# Patient Record
Sex: FEMALE | Race: BLACK OR AFRICAN AMERICAN | NOT HISPANIC OR LATINO | Employment: OTHER | URBAN - NONMETROPOLITAN AREA
[De-identification: names, ages, dates, MRNs, and addresses within clinical notes are randomized per-mention and may not be internally consistent; named-entity substitution may affect disease eponyms.]

---

## 2020-06-12 ENCOUNTER — HISTORICAL (OUTPATIENT)
Dept: ADMINISTRATIVE | Facility: HOSPITAL | Age: 80
End: 2020-06-12

## 2020-06-12 LAB
ALBUMIN SERPL BCP-MCNC: 2.7 G/DL (ref 3.5–5)
ALBUMIN/GLOB SERPL ELPH: 0.5 {RATIO} (ref 1.5–2.2)
ALP SERPL-CCNC: 533 U/L (ref 45–117)
ALT SERPL W P-5'-P-CCNC: 23 U/L (ref 13–56)
ANION GAP SERPL CALC-SCNC: 5.7 MEQ/L (ref 10–20)
AST SERPL-CCNC: 30 U/L (ref 15–37)
BASOPHILS NFR BLD: 0.1 10 (ref 0–0.1)
BASOPHILS NFR BLD: 0.8 % (ref 0–1.5)
BILIRUB SERPL-MCNC: 0.7 MG/DL (ref 0.2–1)
BUN SERPL-MCNC: 34 MG/DL (ref 7–18)
CALCIUM SERPL-MCNC: 8.7 MG/DL (ref 8.5–10.1)
CHLORIDE SERPL-SCNC: 98 MMOL/L (ref 98–107)
CO2 SERPL-SCNC: 31 MMOL/L (ref 22–32)
CREAT SERPL-MCNC: 1.36 MG/DL (ref 0.55–1.02)
EGFR: 48 ML/MIN/1.73M
EOSINOPHIL NFR BLD: 0 % (ref 0–7)
EOSINOPHIL NFR BLD: 0 10 (ref 0–0.7)
ERYTHROCYTE [DISTWIDTH] IN BLOOD BY AUTOMATED COUNT: 17.2 % (ref 11.5–14.5)
GLOBULIN: 5.2 G/DL (ref 2.3–3.5)
GLUCOSE SERPL-MCNC: 94 MG/DL (ref 70–99)
GRAN #: 5.76 10 (ref 2–7.5)
GRAN%: 0.3 %
GRAN%: 75.4 % (ref 50–80)
HCT VFR BLD AUTO: 37.5 % (ref 37.7–47.9)
HGB BLD-MCNC: 11.4 G/DL (ref 12.2–16.2)
IMMATURE GRANULOCYTES #: 0.02 10
LYMPH #: 1.1 10 (ref 1–3.5)
LYMPH%: 14.1 % (ref 12–50)
MCH RBC QN AUTO: 24.6 PG (ref 27–31)
MCHC RBC AUTO-ENTMCNC: 30.4 G% (ref 32–35)
MCV RBC AUTO: 80.8 FL (ref 80–97)
MONO #: 0.7 10 (ref 0–0.8)
MONO%: 9.4 % (ref 0–12)
OSMOC: 268 MOSM/KG (ref 275–295)
PMV BLD AUTO: 440 10 (ref 142–424)
PMV BLD AUTO: 9.4 FL (ref 7.4–10.4)
POTASSIUM SERPL-SCNC: 4.7 MMOL/L (ref 3.5–5.1)
PROT SERPL-MCNC: 7.9 G/DL (ref 6.4–8.2)
RBC # BLD AUTO: 4.64 M/UL (ref 4.04–5.48)
SODIUM BLD-SCNC: 130 MMOL/L (ref 136–145)
WBC # BLD AUTO: 7.6 10 (ref 4–10.2)

## 2020-06-25 ENCOUNTER — HISTORICAL (OUTPATIENT)
Dept: ADMINISTRATIVE | Facility: HOSPITAL | Age: 80
End: 2020-06-25

## 2020-07-30 DIAGNOSIS — I50.82 BIVENTRICULAR FAILURE: Primary | ICD-10-CM

## 2020-07-30 DIAGNOSIS — R06.02 SHORTNESS OF BREATH: ICD-10-CM

## 2020-07-30 DIAGNOSIS — R07.9 CHEST PAIN, UNSPECIFIED: ICD-10-CM

## 2020-07-31 DIAGNOSIS — I50.82 BIVENTRICULAR FAILURE: ICD-10-CM

## 2020-07-31 DIAGNOSIS — R06.02 SHORTNESS OF BREATH: Primary | ICD-10-CM

## 2020-08-03 ENCOUNTER — HOSPITAL ENCOUNTER (OUTPATIENT)
Dept: RADIOLOGY | Facility: HOSPITAL | Age: 80
Discharge: HOME | End: 2020-08-03
Attending: INTERNAL MEDICINE
Payer: MEDICARE

## 2020-08-03 DIAGNOSIS — R06.02 SHORTNESS OF BREATH: ICD-10-CM

## 2020-08-03 DIAGNOSIS — I50.82 BIVENTRICULAR FAILURE: ICD-10-CM

## 2020-08-03 PROCEDURE — 25500020 PHARM REV CODE 255: Performed by: INTERNAL MEDICINE

## 2020-08-03 PROCEDURE — 74175 CTA ABDOMEN W/CONTRAST: CPT | Mod: TC

## 2020-08-03 RX ADMIN — IOHEXOL 100 ML: 350 INJECTION, SOLUTION INTRAVENOUS at 09:08

## 2020-11-10 DIAGNOSIS — Z12.31 ENCOUNTER FOR SCREENING MAMMOGRAM FOR MALIGNANT NEOPLASM OF BREAST: Primary | ICD-10-CM

## 2020-11-23 ENCOUNTER — HOSPITAL ENCOUNTER (OUTPATIENT)
Dept: RADIOLOGY | Facility: HOSPITAL | Age: 80
Discharge: HOME | End: 2020-11-23
Attending: INTERNAL MEDICINE
Payer: MEDICARE

## 2020-11-23 VITALS — BODY MASS INDEX: 20.02 KG/M2 | HEIGHT: 63 IN | WEIGHT: 113 LBS

## 2020-11-23 DIAGNOSIS — Z12.31 ENCOUNTER FOR SCREENING MAMMOGRAM FOR MALIGNANT NEOPLASM OF BREAST: ICD-10-CM

## 2020-11-23 PROCEDURE — 77067 SCR MAMMO BI INCL CAD: CPT | Mod: TC

## 2021-04-01 PROCEDURE — 91300 COVID-19, MRNA, LNP-S, PF, 30 MCG/0.3 ML DOSE VACCINE: CPT | Mod: ,,, | Performed by: ANESTHESIOLOGY

## 2021-04-01 PROCEDURE — 91300 COVID-19, MRNA, LNP-S, PF, 30 MCG/0.3 ML DOSE VACCINE: ICD-10-PCS | Mod: ,,, | Performed by: ANESTHESIOLOGY

## 2021-04-01 PROCEDURE — 0001A COVID-19, MRNA, LNP-S, PF, 30 MCG/0.3 ML DOSE VACCINE: CPT | Mod: CV19,,, | Performed by: ANESTHESIOLOGY

## 2021-04-01 PROCEDURE — 0001A COVID-19, MRNA, LNP-S, PF, 30 MCG/0.3 ML DOSE VACCINE: ICD-10-PCS | Mod: CV19,,, | Performed by: ANESTHESIOLOGY

## 2021-04-23 ENCOUNTER — IMMUNIZATION (OUTPATIENT)
Dept: OBSTETRICS AND GYNECOLOGY | Facility: CLINIC | Age: 81
End: 2021-04-23
Payer: MEDICARE

## 2021-04-23 DIAGNOSIS — Z23 NEED FOR VACCINATION: Primary | ICD-10-CM

## 2021-04-23 PROCEDURE — 0001A COVID-19, MRNA, LNP-S, PF, 30 MCG/0.3 ML DOSE VACCINE: ICD-10-PCS | Mod: CV19,,, | Performed by: ANESTHESIOLOGY

## 2021-04-23 PROCEDURE — 0001A COVID-19, MRNA, LNP-S, PF, 30 MCG/0.3 ML DOSE VACCINE: CPT | Mod: CV19,,, | Performed by: ANESTHESIOLOGY

## 2021-04-23 PROCEDURE — 91300 COVID-19, MRNA, LNP-S, PF, 30 MCG/0.3 ML DOSE VACCINE: CPT | Mod: ,,, | Performed by: ANESTHESIOLOGY

## 2021-04-23 PROCEDURE — 91300 COVID-19, MRNA, LNP-S, PF, 30 MCG/0.3 ML DOSE VACCINE: ICD-10-PCS | Mod: ,,, | Performed by: ANESTHESIOLOGY

## 2021-12-09 DIAGNOSIS — Z12.31 SCREENING MAMMOGRAM, ENCOUNTER FOR: Primary | ICD-10-CM

## 2021-12-29 ENCOUNTER — HOSPITAL ENCOUNTER (OUTPATIENT)
Dept: RADIOLOGY | Facility: HOSPITAL | Age: 81
Discharge: HOME | End: 2021-12-29
Attending: INTERNAL MEDICINE
Payer: MEDICARE

## 2021-12-29 VITALS — WEIGHT: 159 LBS | HEIGHT: 63 IN | BODY MASS INDEX: 28.17 KG/M2

## 2021-12-29 DIAGNOSIS — Z12.31 SCREENING MAMMOGRAM, ENCOUNTER FOR: ICD-10-CM

## 2021-12-29 PROCEDURE — 77067 SCR MAMMO BI INCL CAD: CPT | Mod: TC

## 2022-01-01 ENCOUNTER — LAB VISIT (OUTPATIENT)
Dept: LAB | Facility: HOSPITAL | Age: 82
End: 2022-01-01
Attending: INTERNAL MEDICINE
Payer: MEDICARE

## 2022-01-01 DIAGNOSIS — J96.11 CHRONIC HYPOXEMIC RESPIRATORY FAILURE: Primary | ICD-10-CM

## 2022-01-01 DIAGNOSIS — J96.11 CHRONIC RESPIRATORY FAILURE WITH HYPOXIA: Primary | ICD-10-CM

## 2022-01-01 LAB
ALBUMIN SERPL BCP-MCNC: 3.4 G/DL (ref 3.5–5.2)
ALBUMIN SERPL BCP-MCNC: 3.4 G/DL (ref 3.5–5.2)
ALP SERPL-CCNC: 189 U/L (ref 55–135)
ALP SERPL-CCNC: 243 U/L (ref 55–135)
ALT SERPL W/O P-5'-P-CCNC: 24 U/L (ref 10–44)
ALT SERPL W/O P-5'-P-CCNC: 33 U/L (ref 10–44)
AST SERPL-CCNC: 25 U/L (ref 10–40)
AST SERPL-CCNC: 40 U/L (ref 10–40)
BILIRUB DIRECT SERPL-MCNC: 0.1 MG/DL (ref 0.1–0.3)
BILIRUB DIRECT SERPL-MCNC: <0.1 MG/DL (ref 0.1–0.3)
BILIRUB SERPL-MCNC: 0.2 MG/DL (ref 0.1–1)
BILIRUB SERPL-MCNC: 0.3 MG/DL (ref 0.1–1)
PROT SERPL-MCNC: 8.1 G/DL (ref 6–8.4)
PROT SERPL-MCNC: 8.2 G/DL (ref 6–8.4)

## 2022-01-01 PROCEDURE — 36415 COLL VENOUS BLD VENIPUNCTURE: CPT | Performed by: INTERNAL MEDICINE

## 2022-01-01 PROCEDURE — 80076 HEPATIC FUNCTION PANEL: CPT | Performed by: INTERNAL MEDICINE

## 2022-03-02 ENCOUNTER — LAB VISIT (OUTPATIENT)
Dept: LAB | Facility: HOSPITAL | Age: 82
End: 2022-03-02
Attending: INTERNAL MEDICINE
Payer: MEDICARE

## 2022-03-02 DIAGNOSIS — M47.816 OSTEOARTHRITIS OF LUMBAR SPINE: ICD-10-CM

## 2022-03-02 DIAGNOSIS — I25.10 CAD (CORONARY ARTERY DISEASE): Primary | ICD-10-CM

## 2022-03-02 LAB
ALBUMIN SERPL BCP-MCNC: 3.2 G/DL (ref 3.5–5.2)
ALP SERPL-CCNC: 224 U/L (ref 55–135)
ALT SERPL W/O P-5'-P-CCNC: 16 U/L (ref 10–44)
ANION GAP SERPL CALC-SCNC: 2 MMOL/L (ref 8–16)
AST SERPL-CCNC: 22 U/L (ref 10–40)
BASOPHILS # BLD AUTO: 0.05 K/UL (ref 0–0.2)
BASOPHILS NFR BLD: 0.6 % (ref 0–1.9)
BILIRUB SERPL-MCNC: 0.3 MG/DL (ref 0.1–1)
BUN SERPL-MCNC: 29 MG/DL (ref 8–23)
CALCIUM SERPL-MCNC: 9 MG/DL (ref 8.7–10.5)
CHLORIDE SERPL-SCNC: 108 MMOL/L (ref 95–110)
CHOLEST SERPL-MCNC: 200 MG/DL (ref 120–199)
CHOLEST/HDLC SERPL: 3.3 {RATIO} (ref 2–5)
CO2 SERPL-SCNC: 30 MMOL/L (ref 23–29)
CREAT SERPL-MCNC: 1.6 MG/DL (ref 0.5–1.4)
DIFFERENTIAL METHOD: ABNORMAL
EOSINOPHIL # BLD AUTO: 0.4 K/UL (ref 0–0.5)
EOSINOPHIL NFR BLD: 4.3 % (ref 0–8)
ERYTHROCYTE [DISTWIDTH] IN BLOOD BY AUTOMATED COUNT: 13.6 % (ref 11.5–14.5)
EST. GFR  (AFRICAN AMERICAN): 35.1 ML/MIN/1.73 M^2
EST. GFR  (NON AFRICAN AMERICAN): 30.4 ML/MIN/1.73 M^2
ESTIMATED AVG GLUCOSE: 103 MG/DL (ref 68–131)
GLUCOSE SERPL-MCNC: 112 MG/DL (ref 70–110)
HBA1C MFR BLD: 5.2 % (ref 4–5.6)
HCT VFR BLD AUTO: 38.8 % (ref 37–48.5)
HDLC SERPL-MCNC: 61 MG/DL (ref 40–75)
HDLC SERPL: 30.5 % (ref 20–50)
HGB BLD-MCNC: 12 G/DL (ref 12–16)
IMM GRANULOCYTES # BLD AUTO: 0.03 K/UL (ref 0–0.04)
IMM GRANULOCYTES NFR BLD AUTO: 0.4 % (ref 0–0.5)
LDLC SERPL CALC-MCNC: 118.4 MG/DL (ref 63–159)
LYMPHOCYTES # BLD AUTO: 1.5 K/UL (ref 1–4.8)
LYMPHOCYTES NFR BLD: 17.9 % (ref 18–48)
MCH RBC QN AUTO: 30.2 PG (ref 27–31)
MCHC RBC AUTO-ENTMCNC: 30.9 G/DL (ref 32–36)
MCV RBC AUTO: 98 FL (ref 82–98)
MONOCYTES # BLD AUTO: 1 K/UL (ref 0.3–1)
MONOCYTES NFR BLD: 11.8 % (ref 4–15)
NEUTROPHILS # BLD AUTO: 5.5 K/UL (ref 1.8–7.7)
NEUTROPHILS NFR BLD: 65 % (ref 38–73)
NONHDLC SERPL-MCNC: 139 MG/DL
NRBC BLD-RTO: 0 /100 WBC
PLATELET # BLD AUTO: 294 K/UL (ref 150–450)
PMV BLD AUTO: 9.7 FL (ref 9.2–12.9)
POTASSIUM SERPL-SCNC: 5 MMOL/L (ref 3.5–5.1)
PROT SERPL-MCNC: 8 G/DL (ref 6–8.4)
RBC # BLD AUTO: 3.97 M/UL (ref 4–5.4)
SODIUM SERPL-SCNC: 140 MMOL/L (ref 136–145)
TRIGL SERPL-MCNC: 103 MG/DL (ref 30–150)
TSH SERPL DL<=0.005 MIU/L-ACNC: 0.73 UIU/ML (ref 0.4–4)
WBC # BLD AUTO: 8.51 K/UL (ref 3.9–12.7)

## 2022-03-02 PROCEDURE — 80061 LIPID PANEL: CPT | Performed by: INTERNAL MEDICINE

## 2022-03-02 PROCEDURE — 83036 HEMOGLOBIN GLYCOSYLATED A1C: CPT | Mod: GA | Performed by: INTERNAL MEDICINE

## 2022-03-02 PROCEDURE — 84443 ASSAY THYROID STIM HORMONE: CPT | Mod: GA | Performed by: INTERNAL MEDICINE

## 2022-03-02 PROCEDURE — 85025 COMPLETE CBC W/AUTO DIFF WBC: CPT | Performed by: INTERNAL MEDICINE

## 2022-03-02 PROCEDURE — 80053 COMPREHEN METABOLIC PANEL: CPT | Performed by: INTERNAL MEDICINE

## 2022-03-02 PROCEDURE — 36415 COLL VENOUS BLD VENIPUNCTURE: CPT | Performed by: INTERNAL MEDICINE

## 2022-08-08 ENCOUNTER — LAB VISIT (OUTPATIENT)
Dept: LAB | Facility: HOSPITAL | Age: 82
End: 2022-08-08
Attending: INTERNAL MEDICINE
Payer: MEDICARE

## 2022-08-08 DIAGNOSIS — J96.11 CHRONIC HYPOXEMIC RESPIRATORY FAILURE: Primary | ICD-10-CM

## 2022-08-08 LAB
ALBUMIN SERPL BCP-MCNC: 3.3 G/DL (ref 3.5–5.2)
ALP SERPL-CCNC: 175 U/L (ref 55–135)
ALT SERPL W/O P-5'-P-CCNC: 16 U/L (ref 10–44)
AST SERPL-CCNC: 23 U/L (ref 10–40)
BILIRUB DIRECT SERPL-MCNC: <0.1 MG/DL (ref 0.1–0.3)
BILIRUB SERPL-MCNC: 0.2 MG/DL (ref 0.1–1)
PROT SERPL-MCNC: 7.7 G/DL (ref 6–8.4)

## 2022-08-08 PROCEDURE — 36415 COLL VENOUS BLD VENIPUNCTURE: CPT | Performed by: INTERNAL MEDICINE

## 2022-08-08 PROCEDURE — 80076 HEPATIC FUNCTION PANEL: CPT | Performed by: INTERNAL MEDICINE

## 2022-09-15 ENCOUNTER — LAB VISIT (OUTPATIENT)
Dept: LAB | Facility: HOSPITAL | Age: 82
End: 2022-09-15
Attending: INTERNAL MEDICINE
Payer: MEDICARE

## 2022-09-15 DIAGNOSIS — J96.11 CHRONIC HYPOXEMIC RESPIRATORY FAILURE: Primary | ICD-10-CM

## 2022-09-15 LAB
ALBUMIN SERPL BCP-MCNC: 3.2 G/DL (ref 3.5–5.2)
ALP SERPL-CCNC: 246 U/L (ref 55–135)
ALT SERPL W/O P-5'-P-CCNC: 24 U/L (ref 10–44)
AST SERPL-CCNC: 29 U/L (ref 10–40)
BILIRUB DIRECT SERPL-MCNC: 0.1 MG/DL (ref 0.1–0.3)
BILIRUB SERPL-MCNC: 0.3 MG/DL (ref 0.1–1)
PROT SERPL-MCNC: 8.4 G/DL (ref 6–8.4)

## 2022-09-15 PROCEDURE — 80076 HEPATIC FUNCTION PANEL: CPT | Performed by: INTERNAL MEDICINE

## 2022-09-15 PROCEDURE — 36415 COLL VENOUS BLD VENIPUNCTURE: CPT | Performed by: INTERNAL MEDICINE

## 2023-01-01 ENCOUNTER — HOSPITAL ENCOUNTER (EMERGENCY)
Facility: HOSPITAL | Age: 83
End: 2023-09-28
Attending: EMERGENCY MEDICINE
Payer: MEDICARE

## 2023-01-01 ENCOUNTER — HOSPITAL ENCOUNTER (OUTPATIENT)
Dept: RADIOLOGY | Facility: HOSPITAL | Age: 83
Discharge: HOME | End: 2023-02-13
Attending: INTERNAL MEDICINE
Payer: MEDICARE

## 2023-01-01 VITALS
HEART RATE: 104 BPM | OXYGEN SATURATION: 93 % | SYSTOLIC BLOOD PRESSURE: 70 MMHG | HEIGHT: 60 IN | DIASTOLIC BLOOD PRESSURE: 36 MMHG | BODY MASS INDEX: 28.66 KG/M2 | TEMPERATURE: 96 F | WEIGHT: 146 LBS

## 2023-01-01 DIAGNOSIS — E87.5 HYPERKALEMIA: Primary | ICD-10-CM

## 2023-01-01 DIAGNOSIS — Z12.31 ENCOUNTER FOR SCREENING MAMMOGRAM FOR MALIGNANT NEOPLASM OF BREAST: Primary | ICD-10-CM

## 2023-01-01 DIAGNOSIS — Z12.31 ENCOUNTER FOR SCREENING MAMMOGRAM FOR MALIGNANT NEOPLASM OF BREAST: ICD-10-CM

## 2023-01-01 DIAGNOSIS — I46.9 CARDIAC ARREST: ICD-10-CM

## 2023-01-01 LAB
ALBUMIN SERPL BCP-MCNC: 2 G/DL (ref 3.5–5.2)
ALBUMIN SERPL BCP-MCNC: 2.9 G/DL (ref 3.5–5.2)
ALP SERPL-CCNC: 122 U/L (ref 55–135)
ALP SERPL-CCNC: 166 U/L (ref 55–135)
ALT SERPL W/O P-5'-P-CCNC: 173 U/L (ref 10–44)
ALT SERPL W/O P-5'-P-CCNC: 215 U/L (ref 10–44)
ANION GAP SERPL CALC-SCNC: 11 MMOL/L (ref 3–11)
ANION GAP SERPL CALC-SCNC: 14 MMOL/L (ref 3–11)
AST SERPL-CCNC: 284 U/L (ref 10–40)
AST SERPL-CCNC: 307 U/L (ref 10–40)
BASOPHILS # BLD AUTO: 0.04 K/UL (ref 0–0.2)
BASOPHILS NFR BLD: 0.2 % (ref 0–1.9)
BILIRUB SERPL-MCNC: 0.7 MG/DL (ref 0.1–1)
BILIRUB SERPL-MCNC: 0.9 MG/DL (ref 0.1–1)
BUN SERPL-MCNC: 61 MG/DL (ref 8–23)
BUN SERPL-MCNC: 63 MG/DL (ref 8–23)
CALCIUM SERPL-MCNC: 16.3 MG/DL (ref 8.7–10.5)
CALCIUM SERPL-MCNC: 8.5 MG/DL (ref 8.7–10.5)
CHLORIDE SERPL-SCNC: 101 MMOL/L (ref 95–110)
CHLORIDE SERPL-SCNC: 111 MMOL/L (ref 95–110)
CO2 SERPL-SCNC: 16 MMOL/L (ref 23–29)
CO2 SERPL-SCNC: 17 MMOL/L (ref 23–29)
CREAT SERPL-MCNC: 3.8 MG/DL (ref 0.5–1.4)
CREAT SERPL-MCNC: 4.2 MG/DL (ref 0.5–1.4)
DIFFERENTIAL METHOD: ABNORMAL
EOSINOPHIL # BLD AUTO: 0 K/UL (ref 0–0.5)
EOSINOPHIL NFR BLD: 0.1 % (ref 0–8)
ERYTHROCYTE [DISTWIDTH] IN BLOOD BY AUTOMATED COUNT: 14.4 % (ref 11.5–14.5)
EST. GFR  (NO RACE VARIABLE): 10.1 ML/MIN/1.73 M^2
EST. GFR  (NO RACE VARIABLE): 11.4 ML/MIN/1.73 M^2
GLUCOSE SERPL-MCNC: 113 MG/DL (ref 70–110)
GLUCOSE SERPL-MCNC: 248 MG/DL (ref 70–110)
HCT VFR BLD AUTO: 33.5 % (ref 37–48.5)
HGB BLD-MCNC: 9.5 G/DL (ref 12–16)
IMM GRANULOCYTES # BLD AUTO: 0.51 K/UL (ref 0–0.04)
IMM GRANULOCYTES NFR BLD AUTO: 3.2 % (ref 0–0.5)
INR PPP: 2.1 (ref 0.8–1.2)
LYMPHOCYTES # BLD AUTO: 3.3 K/UL (ref 1–4.8)
LYMPHOCYTES NFR BLD: 20.5 % (ref 18–48)
MAGNESIUM SERPL-MCNC: 4.5 MG/DL (ref 1.6–2.6)
MCH RBC QN AUTO: 31.1 PG (ref 27–31)
MCHC RBC AUTO-ENTMCNC: 28.4 G/DL (ref 32–36)
MCV RBC AUTO: 110 FL (ref 82–98)
MONOCYTES # BLD AUTO: 1.4 K/UL (ref 0.3–1)
MONOCYTES NFR BLD: 8.6 % (ref 4–15)
NEUTROPHILS # BLD AUTO: 10.8 K/UL (ref 1.8–7.7)
NEUTROPHILS NFR BLD: 67.4 % (ref 38–73)
NRBC BLD-RTO: 2 /100 WBC
PLATELET # BLD AUTO: 356 K/UL (ref 150–450)
PMV BLD AUTO: 9.9 FL (ref 9.2–12.9)
POTASSIUM SERPL-SCNC: 6.8 MMOL/L (ref 3.5–5.1)
POTASSIUM SERPL-SCNC: 8.9 MMOL/L (ref 3.5–5.1)
PROT SERPL-MCNC: 4.5 G/DL (ref 6–8.4)
PROT SERPL-MCNC: 6.3 G/DL (ref 6–8.4)
PROTHROMBIN TIME: 19.6 SEC (ref 9–12.5)
RBC # BLD AUTO: 3.05 M/UL (ref 4–5.4)
SODIUM SERPL-SCNC: 131 MMOL/L (ref 136–145)
SODIUM SERPL-SCNC: 139 MMOL/L (ref 136–145)
TROPONIN I SERPL DL<=0.01 NG/ML-MCNC: 181.3 PG/ML (ref 0–60)
WBC # BLD AUTO: 16.07 K/UL (ref 3.9–12.7)

## 2023-01-01 PROCEDURE — 85610 PROTHROMBIN TIME: CPT | Performed by: EMERGENCY MEDICINE

## 2023-01-01 PROCEDURE — 93010 ELECTROCARDIOGRAM REPORT: CPT | Mod: 76,,, | Performed by: INTERNAL MEDICINE

## 2023-01-01 PROCEDURE — 93005 ELECTROCARDIOGRAM TRACING: CPT

## 2023-01-01 PROCEDURE — 77067 SCR MAMMO BI INCL CAD: CPT | Mod: TC

## 2023-01-01 PROCEDURE — 25000003 PHARM REV CODE 250: Performed by: EMERGENCY MEDICINE

## 2023-01-01 PROCEDURE — 85025 COMPLETE CBC W/AUTO DIFF WBC: CPT | Performed by: EMERGENCY MEDICINE

## 2023-01-01 PROCEDURE — 99285 EMERGENCY DEPT VISIT HI MDM: CPT | Mod: 25

## 2023-01-01 PROCEDURE — 83735 ASSAY OF MAGNESIUM: CPT | Performed by: EMERGENCY MEDICINE

## 2023-01-01 PROCEDURE — 94002 VENT MGMT INPAT INIT DAY: CPT

## 2023-01-01 PROCEDURE — 36415 COLL VENOUS BLD VENIPUNCTURE: CPT | Performed by: EMERGENCY MEDICINE

## 2023-01-01 PROCEDURE — 99900035 HC TECH TIME PER 15 MIN (STAT)

## 2023-01-01 PROCEDURE — 96375 TX/PRO/DX INJ NEW DRUG ADDON: CPT

## 2023-01-01 PROCEDURE — 84484 ASSAY OF TROPONIN QUANT: CPT | Performed by: EMERGENCY MEDICINE

## 2023-01-01 PROCEDURE — 63600175 PHARM REV CODE 636 W HCPCS: Performed by: EMERGENCY MEDICINE

## 2023-01-01 PROCEDURE — 80053 COMPREHEN METABOLIC PANEL: CPT | Performed by: EMERGENCY MEDICINE

## 2023-01-01 PROCEDURE — 96376 TX/PRO/DX INJ SAME DRUG ADON: CPT

## 2023-01-01 PROCEDURE — 93010 EKG 12-LEAD: ICD-10-PCS | Mod: 76,,, | Performed by: INTERNAL MEDICINE

## 2023-01-01 PROCEDURE — 27000221 HC OXYGEN, UP TO 24 HOURS

## 2023-01-01 PROCEDURE — 96365 THER/PROPH/DIAG IV INF INIT: CPT

## 2023-01-01 RX ORDER — ATROPINE SULFATE 0.1 MG/ML
INJECTION INTRAVENOUS CODE/TRAUMA/SEDATION MEDICATION
Status: COMPLETED | OUTPATIENT
Start: 2023-01-01 | End: 2023-01-01

## 2023-01-01 RX ORDER — ALBUTEROL SULFATE 2.5 MG/.5ML
2.5 SOLUTION RESPIRATORY (INHALATION)
Status: DISPENSED | OUTPATIENT
Start: 2023-01-01 | End: 2023-01-01

## 2023-01-01 RX ORDER — CALCIUM CHLORIDE INJECTION 100 MG/ML
INJECTION, SOLUTION INTRAVENOUS CODE/TRAUMA/SEDATION MEDICATION
Status: COMPLETED | OUTPATIENT
Start: 2023-01-01 | End: 2023-01-01

## 2023-01-01 RX ORDER — EPINEPHRINE 0.1 MG/ML
INJECTION INTRAVENOUS CODE/TRAUMA/SEDATION MEDICATION
Status: COMPLETED | OUTPATIENT
Start: 2023-01-01 | End: 2023-01-01

## 2023-01-01 RX ORDER — NOREPINEPHRINE BITARTRATE/D5W 4MG/250ML
PLASTIC BAG, INJECTION (ML) INTRAVENOUS
Status: DISCONTINUED
Start: 2023-01-01 | End: 2023-01-01 | Stop reason: HOSPADM

## 2023-01-01 RX ORDER — SODIUM BICARBONATE 1 MEQ/ML
SYRINGE (ML) INTRAVENOUS CODE/TRAUMA/SEDATION MEDICATION
Status: COMPLETED | OUTPATIENT
Start: 2023-01-01 | End: 2023-01-01

## 2023-01-01 RX ORDER — DEXTROSE 50 % IN WATER (D50W) INTRAVENOUS SYRINGE
25
Status: COMPLETED | OUTPATIENT
Start: 2023-01-01 | End: 2023-01-01

## 2023-01-01 RX ORDER — MAGNESIUM SULFATE HEPTAHYDRATE 500 MG/ML
INJECTION, SOLUTION INTRAMUSCULAR; INTRAVENOUS CODE/TRAUMA/SEDATION MEDICATION
Status: COMPLETED | OUTPATIENT
Start: 2023-01-01 | End: 2023-01-01

## 2023-01-01 RX ORDER — ATROPINE SULFATE 0.1 MG/ML
0.5 INJECTION INTRAVENOUS
Status: DISCONTINUED | OUTPATIENT
Start: 2023-01-01 | End: 2023-01-01 | Stop reason: HOSPADM

## 2023-01-01 RX ADMIN — EPINEPHRINE 1 MG: 0.1 INJECTION INTRACARDIAC; INTRAVENOUS at 08:09

## 2023-01-01 RX ADMIN — ATROPINE SULFATE 0.5 MG: 0.1 INJECTION PARENTERAL at 08:09

## 2023-01-01 RX ADMIN — NOREPINEPHRINE BITARTRATE 0.02 MCG/KG/MIN: 1 INJECTION, SOLUTION, CONCENTRATE INTRAVENOUS at 08:09

## 2023-01-01 RX ADMIN — INSULIN HUMAN 5 UNITS: 100 INJECTION, SOLUTION PARENTERAL at 09:09

## 2023-01-01 RX ADMIN — CALCIUM CHLORIDE 1 G: 100 INJECTION INTRAVENOUS; INTRAVENTRICULAR at 08:09

## 2023-01-01 RX ADMIN — MAGNESIUM SULFATE HEPTAHYDRATE 2 G: 500 INJECTION, SOLUTION INTRAMUSCULAR; INTRAVENOUS at 09:09

## 2023-01-01 RX ADMIN — EPINEPHRINE 1 MG: 0.1 INJECTION INTRACARDIAC; INTRAVENOUS at 09:09

## 2023-01-01 RX ADMIN — CALCIUM CHLORIDE 1 G: 100 INJECTION INTRAVENOUS; INTRAVENTRICULAR at 09:09

## 2023-01-01 RX ADMIN — DEXTROSE MONOHYDRATE 25 G: 25 INJECTION, SOLUTION INTRAVENOUS at 09:09

## 2023-01-01 RX ADMIN — SODIUM BICARBONATE 50 MEQ: 84 INJECTION, SOLUTION INTRAVENOUS at 09:09

## 2023-09-28 NOTE — ED PROVIDER NOTES
Encounter Date: 9/28/2023       History     Chief Complaint   Patient presents with    Cardiac Arrest     Patient was going see dr. Malhotra for her appointment and suhail went bring her to the bathroom and patient stated she did not feel good and went unresponsive granddaughter called 911. When alexandra arrived raymon paramedic states the patient was unresponsive but agonal, when she got patient in the back of the ambulance patient lost a pulse and remained asystole with pupils pin point, narcan 2mg give IN and two rounds of epi CPR continues on arrival      Patient presents via EMS due to cardiac arrest.  When they arrived at the home at approximately 8:00 a.m. at the home where she lives with family she was having agonal breathing was on the floor.  She lost pulses and subsequently CPR was started in 2 rounds of epinephrine were provided.  In the emergency department definitive airway was established with good color change and good breath sounds bilaterally.  CPR was continued 18 gauge access in right upper extremity.      Review of patient's allergies indicates:  No Known Allergies  No past medical history on file.  Past Surgical History:   Procedure Laterality Date    HYSTERECTOMY      [place] REMOVAL GALLBLADDER      Cholecystectomy    [place] TOTAL ABDOM HYSTERECTOMY      Hysterectomy, Total Abdominal     Family History   Problem Relation Age of Onset    No Known Problems Mother     No Known Problems Father     No Known Problems Sister     No Known Problems Daughter     No Known Problems Maternal Aunt     No Known Problems Maternal Uncle     No Known Problems Paternal Aunt     No Known Problems Paternal Uncle     No Known Problems Maternal Grandmother     No Known Problems Maternal Grandfather     No Known Problems Paternal Grandmother     No Known Problems Paternal Grandfather     Breast cancer Neg Hx     Ovarian cancer Neg Hx     BRCA 1/2 Neg Hx         Review of Systems   Unable to perform ROS: Patient  unresponsive       Physical Exam     Initial Vitals   BP Pulse Resp Temp SpO2   09/28/23 0835 09/28/23 0835 09/28/23 0835 09/28/23 0838 09/28/23 0905   128/70 85 (!) 23 96.3 °F (35.7 °C) 95 %      MAP       --                Physical Exam    HENT:   Head: Normocephalic and atraumatic.   Pupils fixed and dilated   Abdominal: Abdomen is soft.   Musculoskeletal:      Comments: +1 bilateral lower extremity edema     Neurological:   GCS of 3   Skin:   Cool skin         ED Course   Procedures  Labs Reviewed   CBC W/ AUTO DIFFERENTIAL - Abnormal; Notable for the following components:       Result Value    WBC 16.07 (*)     RBC 3.05 (*)     Hemoglobin 9.5 (*)     Hematocrit 33.5 (*)      (*)     MCH 31.1 (*)     MCHC 28.4 (*)     Immature Granulocytes 3.2 (*)     Gran # (ANC) 10.8 (*)     Immature Grans (Abs) 0.51 (*)     Mono # 1.4 (*)     nRBC 2 (*)     All other components within normal limits   COMPREHENSIVE METABOLIC PANEL - Abnormal; Notable for the following components:    Sodium 131 (*)     Potassium 8.9 (*)     CO2 16 (*)     Glucose 113 (*)     BUN 63 (*)     Creatinine 4.2 (*)     Calcium 8.5 (*)     Albumin 2.9 (*)     Alkaline Phosphatase 166 (*)      (*)      (*)     eGFR 10.1 (*)     Anion Gap 14 (*)     All other components within normal limits    Narrative:        Potassium critical result(s) called and verbal readback obtained   from Gisele Arvizu RN, in ER.  by JW3 09/28/2023 08:55   MAGNESIUM - Abnormal; Notable for the following components:    Magnesium 4.5 (*)     All other components within normal limits   TROPONIN I HIGH SENSITIVITY - Abnormal; Notable for the following components:    Troponin I High Sensitivity 181.3 (*)     All other components within normal limits   PROTIME-INR - Abnormal; Notable for the following components:    Prothrombin Time 19.6 (*)     INR 2.1 (*)     All other components within normal limits   COMPREHENSIVE METABOLIC PANEL - Abnormal; Notable for the  following components:    Potassium 6.8 (*)     Chloride 111 (*)     CO2 17 (*)     Glucose 248 (*)     BUN 61 (*)     Creatinine 3.8 (*)     Calcium 16.3 (*)     Total Protein 4.5 (*)     Albumin 2.0 (*)      (*)      (*)     eGFR 11.4 (*)     All other components within normal limits    Narrative:        Potassium and Calcium critical result(s) called and verbal   readback obtained from Nataly Xie RN, in Er. by JW3 09/28/2023   09:48     EKG Readings: (Independently Interpreted)   Time 8:29 a.m.   Wide QRS complex with left axis deviation nonspecific intraventricular block with ST depressions in lead V4/V5/V6.    Due to ST depressions in V4/V5 V6 posterior EKG with leads 5 and 6 placed on back.  Time 8:34 a.m.   Wide QRS rhythm with left axis deviation, no ST elevations in leads 5 and 6 to signify posterior STEMI    Time 8:51 a.m.  Rate is 81, atrial fibrillation with left axis deviation, wide QRS, no STEMI    Time 9:01 a.m.   Rate of 139, wide QRS tachycardia, left axis deviation     ECG Results              EKG 12-lead (Final result)  Result time 09/28/23 16:05:55      Final result by Interface, Lab In [facility] (09/28/23 16:05:55)                   Narrative:    Test Reason : I46.9,    Vent. Rate : 123 BPM     Atrial Rate : 120 BPM     P-R Int : 000 ms          QRS Dur : 162 ms      QT Int : 360 ms       P-R-T Axes : 000 -65 097 degrees     QTc Int : 515 ms    Wide QRS tachycardia  Left axis deviation  Right bundle branch block  Abnormal ECG  When compared with ECG of 28-SEP-2023 08:51,  Wide QRS tachycardia has replaced Atrial fibrillation  ST changes less in lateral leads  Vent. rate has increased BY  42 BPM  Confirmed by Jacoby Cruz MD ([id number]) on 9/28/2023 4:05:50 PM    Referred By: [email or web address]   SELF           Confirmed By:Jacoby Cruz MD                                     EKG 12-lead (Final result)  Result time 09/28/23 16:05:09      Final result by Interface, Lab In [facility] (09/28/23  16:05:09)                   Narrative:    Test Reason : I46.9,    Vent. Rate : 081 BPM     Atrial Rate : 081 BPM     P-R Int : 000 ms          QRS Dur : 200 ms      QT Int : 446 ms       P-R-T Axes : 000 -67 111 degrees     QTc Int : 518 ms    Atrial fibrillation  Left axis deviation  Right bundle branch block  Singnificant ST changes in lateral leads concerning for ischemia.  Morphology suggestive of electrolyte derrangment.  Abnormal ECG  No previous ECGs available  Confirmed by Jacoby Cruz MD ([id number]) on 9/28/2023 4:05:02 PM    Referred By: [email or web address]   SELF           Confirmed By:Jacoby Cruz MD                                  Imaging Results              X-Ray Chest 1 View (Final result)  Result time 09/28/23 10:31:34      Final result by Sabrina Porras MD (09/28/23 10:31:34)                   Impression:      Endotracheal tube and NG tube in place    Diffuse bilateral airspace disease,, likely pulmonary edema and/or pneumonia      Electronically signed by: Sabrina Porras MD  Date:    09/28/2023  Time:    10:31               Narrative:    EXAMINATION:  XR CHEST 1 VIEW    CLINICAL HISTORY:  Cardiac arrest, cause unspecified    COMPARISON:  CT angiogram 08/03/2020    FINDINGS:  Tip of endotracheal tube at clavicular heads.  NG tube overlies left upper abdominal quadrant    Diffuse ground-glass attenuation/airspace opacities throughout both lungs with an upper lung predominance.  Enlarged cardiac silhouette.  Prior CT demonstrated underlying multifocal emphysema/fibrosis.                                    X-Rays:   Independently Interpreted Readings:   Other Readings:  Chest x-ray shows pulmonary edema with cardiomegaly    Medications   albuterol sulfate nebulizer solution 2.5 mg ( Nebulization Canceled Entry 9/28/23 0925)   atropine injection (0.5 mg Intravenous Given 9/28/23 0826)   EPINEPHrine 0.1 mg/mL injection (1 mg Intravenous Given 9/28/23 0828)   EPINEPHrine 0.1 mg/mL injection (1 mg  Intravenous Given 9/28/23 0845)   atropine injection (0.5 mg Intravenous Given 9/28/23 0839)   NORepinephrine 4 mg in dextrose 5 % (D5W) 250 mL infusion (0 mcg/kg/min × 66.2 kg Intravenous Stopped 9/28/23 0935)   EPINEPHrine 0.1 mg/mL injection (1 mg Intravenous Given 9/28/23 0856)   calcium chloride 100 mg/mL (10 %) injection (1 g Intravenous Given 9/28/23 0858)   calcium chloride 100 mg/mL (10 %) injection (1 g Intravenous Given 9/28/23 0859)   insulin regular injection 5 Units 0.05 mL (5 Units Intravenous Given 9/28/23 0908)   dextrose 50 % in water (D50W) injection 25 g (25 g Intravenous Given 9/28/23 0907)   EPINEPHrine 0.1 mg/mL injection (1 mg Intravenous Given 9/28/23 0904)   sodium bicarbonate 8.4 % (1 mEq/mL) injection (50 mEq Intravenous Given 9/28/23 0906)   calcium chloride 100 mg/mL (10 %) injection (1 g Intravenous Given 9/28/23 0916)   magnesium sulfate 4 mEq/mL (50 %) injection (2 g Intravenous Given 9/28/23 0917)     Medical Decision Making  Amount and/or Complexity of Data Reviewed  Labs: ordered.  Radiology: ordered.    Risk  OTC drugs.  Prescription drug management.                          Medical Decision Making:   Initial Assessment:   CPR continued in the emergency department.  Differential Diagnosis:   Differentials include cardiac arrest, electrolyte abnormality, PE, acidosis  Clinical Tests:   Lab Tests: Reviewed  Radiological Study: Reviewed  ED Management:  Igel airway exchange with 7.5 endotracheal tube by paramedic Jose M Driver supervised by me under using glide scope with 4.0 Joann blade.  One attempt.  Color change with CO2 detector with bilateral breath sounds auscultated on exam.    Please see code sheet for details.  Multiple rounds of epinephrine was provided.  Labs drawn.  Patient shown to be in a wide complex bradycardia with ST depressions in V4/5/6.  Leads 5 and 6 were placed on patient's back to evaluate for posterior STEMI which did not show any ST elevations in leads 5  and 6.    Labs came back patient had potassium of 8.9.  Temporizing measures provided including calcium chloride, insulin glucose and bicarb.  Prior to knowing what potassium was patient appeared to be in V-tach and 1 shock was also delivered. Patient lost pulses a total of 5 times.  Patient did show initially mild but weak heart squeeze.  Atropine had been provided a total of 2 times and patient was placed on a Levophed drip.  Ultimately, after the 5th lost pulses patient noted to be in PEA and went into asystole.  Patient had been having chest compressions since approximately 8:00 a.m..  Time of death was called at 927.  Almost 1-1/2 hours CPR was provided between out of hospital and in-hospital ACLS.  Out-of-hospital cardiac arrest with length of time CPR been performed I explained to the family even if pulses would have been regain the odds the patient have any meaning will neurologic recovery would have been low.      Clinical Impression:   Final diagnoses:  [I46.9] Cardiac arrest  [E87.5] Hyperkalemia (Primary)        ED Disposition Condition                     Ananda Antoine MD  [phone]